# Patient Record
Sex: MALE | Race: WHITE | NOT HISPANIC OR LATINO | Employment: FULL TIME | URBAN - METROPOLITAN AREA
[De-identification: names, ages, dates, MRNs, and addresses within clinical notes are randomized per-mention and may not be internally consistent; named-entity substitution may affect disease eponyms.]

---

## 2018-11-01 ENCOUNTER — HOSPITAL ENCOUNTER (EMERGENCY)
Facility: CLINIC | Age: 23
Discharge: HOME | End: 2018-11-01
Attending: FAMILY MEDICINE | Admitting: FAMILY MEDICINE
Payer: OTHER MISCELLANEOUS

## 2018-11-01 VITALS
DIASTOLIC BLOOD PRESSURE: 93 MMHG | OXYGEN SATURATION: 99 % | TEMPERATURE: 97.3 F | HEART RATE: 80 BPM | SYSTOLIC BLOOD PRESSURE: 132 MMHG | RESPIRATION RATE: 20 BRPM

## 2018-11-01 DIAGNOSIS — S01.01XA LACERATION OF SCALP, INITIAL ENCOUNTER: ICD-10-CM

## 2018-11-01 PROCEDURE — 99283 EMERGENCY DEPT VISIT LOW MDM: CPT | Performed by: FAMILY MEDICINE

## 2018-11-01 PROCEDURE — 12001 RPR S/N/AX/GEN/TRNK 2.5CM/<: CPT | Mod: Z6 | Performed by: FAMILY MEDICINE

## 2018-11-01 PROCEDURE — 99283 EMERGENCY DEPT VISIT LOW MDM: CPT | Mod: 25 | Performed by: FAMILY MEDICINE

## 2018-11-01 PROCEDURE — 99282 EMERGENCY DEPT VISIT SF MDM: CPT | Performed by: FAMILY MEDICINE

## 2018-11-01 PROCEDURE — 12001 RPR S/N/AX/GEN/TRNK 2.5CM/<: CPT | Performed by: FAMILY MEDICINE

## 2018-11-01 NOTE — ED AVS SNAPSHOT
[facility] Emergency Department    [address] [place] [address]    Phone:  [phone]    Fax:  [phone]                                       Anjum Silva   MRN: [id number]    Department:  [facility] Emergency Department   Date of Visit:  11/1/2018           After Visit Summary Signature Page     I have received my discharge instructions, and my questions have been answered. I have discussed any challenges I see with this plan with the nurse or doctor.    ..........................................................................................................................................  Patient/Patient Representative Signature      ..........................................................................................................................................  Patient Representative Print Name and Relationship to Patient    ..................................................               ................................................  Date                                   Time    ..........................................................................................................................................  Reviewed by Signature/Title    ...................................................              ..............................................  Date                                               Time          22EPIC Rev 08/18

## 2018-11-01 NOTE — ED TRIAGE NOTES
Pt had a small, less than 20 lb magnet like piece of equipment fall on the top of his head. Has a small Lac on the R side toward the front of his head. No loc and No neck pain. Work related. Tetanus up to date..

## 2018-11-01 NOTE — DISCHARGE INSTRUCTIONS
We used hair sutures to close the laceration.  The glue will wear off on its own.  You can shower and gently wash your hair but do not scrub around that wound.  Watch for signs of infection.  Recheck in clinic if persistent problems or return to the ED if worse/concerns.  Try to keep the wound clean.  It was nice visiting with both of you this morning.  I hope this heals up quickly for you.    Thank you for choosing [facility]. We appreciate the opportunity to meet your urgent medical needs. Please let us know if we could have done anything to make your stay more satisfying.    After discharge, please closely monitor for any new or worsening symptoms. Return to the Emergency Department if you develop any acute worsening signs or symptoms.    If you had lab work, cultures or imaging studies done during your stay, the final results may still be pending. We will call you if your plan of care needs to change. However, if you are not improving as expected, please follow up with your primary care provider or clinic.     Start any prescription medications that were prescribed to you and take them as directed.     Please see additional handouts that may be pertinent to your condition.

## 2018-11-01 NOTE — ED PROVIDER NOTES
History     Chief Complaint   Patient presents with     Head Injury     HPI  Anjum Silva is a 23 year old male who since to the ED with a scalp laceration that occurred at about 3:00 this morning while at work.  He works for [employer] and is accompanied by his boss.  He placed a metal object that weighed about 20 pounds up on a shelf and fell about 2 feet and landed on the top of his head.  He suffered no loss of consciousness.  No dizziness or lightheadedness.  No neck pain.  No other injuries.  He states he is up-to-date on his tetanus.  Bleeding was controlled with pressure.  He is otherwise in good health.    Problem List:    There are no active problems to display for this patient.       Past Medical History:    No past medical history on file.    Past Surgical History:    No past surgical history on file.    Family History:    No family history on file.    Social History:  Marital Status:   [2]  Social History   Substance Use Topics     Smoking status: Not on file     Smokeless tobacco: Not on file     Alcohol use Not on file        Medications:      No current outpatient prescriptions on file.      Review of Systems   All other systems reviewed and are negative.      Physical Exam   BP: (!) 132/93  Pulse: 80  Temp: 97.3  F (36.3  C)  Resp: 20  SpO2: 99 %      Physical Exam   Constitutional: He is oriented to person, place, and time. He appears well-developed and well-nourished. No distress.   HENT:   He has a linear well approximated laceration right in the part of his hair to the right of the midline in the anterior superior scalp.  It is more of a scratch on either and and there is about 6 mm that does extend through the dermis on the central part of the laceration.   Eyes: EOM are normal. Pupils are equal, round, and reactive to light.   Neck: Neck supple.   Nontender.   Pulmonary/Chest: Effort normal. No respiratory distress.   Neurological: He is alert and oriented to person, place, and  time. He has normal strength. No cranial nerve deficit or sensory deficit. GCS eye subscore is 4. GCS verbal subscore is 5. GCS motor subscore is 6.   Skin: Skin is warm and dry.   Psychiatric: He has a normal mood and affect.       ED Course  (with Medical Decision Making)    23-year-old gentleman with a scalp laceration.  No other concerning findings.  It is well approximated and clean.  We discussed stapling versus hair sutures and he opted for the latter.  It came together quite nicely.  The hair sutures were twisted and then secured with Dermabond.  He tolerated this well.  His boss states that with the new Work Comp laws, as long as he is not on any restrictions, I did not need to fill out a report of workability or determine MMI.         ED Course     Procedures               Critical Care time:  none               No results found for this or any previous visit (from the past 24 hour(s)).    Medications - No data to display    Assessments & Plan      I have reviewed the nursing notes.    I have reviewed the findings, diagnosis, plan and need for follow up with the patient.       New Prescriptions    No medications on file       Final diagnoses:   Laceration of scalp, initial encounter - 0.6 cm       11/1/2018   [facility] EMERGENCY DEPARTMENT     Constantino Vinson MD  11/01/18 0458

## 2019-11-12 ENCOUNTER — OFFICE VISIT (OUTPATIENT)
Dept: FAMILY MEDICINE | Facility: CLINIC | Age: 24
End: 2019-11-12
Payer: COMMERCIAL

## 2019-11-12 VITALS
DIASTOLIC BLOOD PRESSURE: 66 MMHG | HEART RATE: 97 BPM | SYSTOLIC BLOOD PRESSURE: 128 MMHG | WEIGHT: 228 LBS | BODY MASS INDEX: 29.26 KG/M2 | RESPIRATION RATE: 18 BRPM | TEMPERATURE: 98.6 F | OXYGEN SATURATION: 98 % | HEIGHT: 74 IN

## 2019-11-12 DIAGNOSIS — Z30.09 ENCOUNTER FOR VASECTOMY COUNSELING: Primary | ICD-10-CM

## 2019-11-12 PROCEDURE — 99202 OFFICE O/P NEW SF 15 MIN: CPT | Performed by: FAMILY MEDICINE

## 2019-11-12 RX ORDER — DIAZEPAM 5 MG
TABLET ORAL
Qty: 2 TABLET | Refills: 0 | Status: SHIPPED | OUTPATIENT
Start: 2019-11-12 | End: 2019-11-12

## 2019-11-12 RX ORDER — DIAZEPAM 5 MG
TABLET ORAL
Qty: 2 TABLET | Refills: 0 | Status: SHIPPED | OUTPATIENT
Start: 2019-11-12

## 2019-11-12 SDOH — HEALTH STABILITY: MENTAL HEALTH: HOW OFTEN DO YOU HAVE A DRINK CONTAINING ALCOHOL?: NEVER

## 2019-11-12 ASSESSMENT — PAIN SCALES - GENERAL: PAINLEVEL: NO PAIN (0)

## 2019-11-12 ASSESSMENT — MIFFLIN-ST. JEOR: SCORE: 2088.95

## 2019-11-12 NOTE — PROGRESS NOTES
"SUBJECTIVE:  This 24 year old male is in for a vasectomy consultation.  He and his partner have decided they don't want to have any more children. They have decided that he will have the sterilization procedure instead of her.  He understands that this is a permanent procedure.  Reversal could be an option, but has a less than 25% success rate.  Patient was given information to read prior to the consultation.      We talked about the risks and benefits of the vasectomy; risks being that of potential for bleeding, infection, postoperative discomfort immediately which can last for a number of weeks afterwards, also the development of spermatoceles or sperm granulomas, epididymal cysts and the possibility of failure of the procedure producing failed attempt at sterility.     Also mentioned to the patient that there is some literature out there that suggests men who have vasectomies are at increased risk for prostate cancer; however, this literature is inconclusive and controversial.  It is recommended that men who have vasectomies should discuss appropriate prostate cancer screening with their regular provider.     I went through the procedure with the patient, how it is done, a midline incision in the scrotum measuring approximately 1/2 inch in length.  The vas deferens is brought up through this incision, dissected free and cut in two.  Each open end is cauterized and then the proximal or distal end is buried away from the other.  Patient had no questions when it came to the procedure itself.  I did show him pictures and diagrams of the procedure.  I showed him where a potential spermatocele or sperm granuloma can occur.      Again, the patient had no further questions for me.    OBJECTIVE:  /66   Pulse 97   Temp 98.6  F (37  C) (Temporal)   Resp 18   Ht 1.88 m (6' 2\")   Wt 103.4 kg (228 lb)   SpO2 98%   BMI 29.27 kg/m    On exam, this is a well-developed, well-nourished white male.    Exam reveals a " normal circumcised penis, no lesions.  Testes are descended bilaterally.  Exam was limited to the genitalia.  Both vas deferens were easily identified.  No other abnormalities were noted.      ASSESSMENT:  Undesired fertility in an adult male, requesting vasectomy.    PLAN:  He will schedule a vasectomy at his convenience for either the last appointment of the morning or on a Thursday or Friday afternoon.  He is aware that he will need to take the entire weekend off and have essentially bedrest for two days with ice packs every two hours and ibuprofen for discomfort.  I offered him a prescription for ibuprofen 800 mg to take every eight hours with food after the procedure, but he declined the prescription as he prefers to use the OTC equivalent.  I gave him a prescription for Valium two 5 mg tablets.  He will take 1 tablet upon arrival to clinic and will have the second tablet available to take as needed.  He understands that he will need a  after the procedure due to taking this type of medication.    If he has any further questions, he will contact me prior to the procedure or ask me on the day of the procedure.  He also is aware that he will need to bring a specimen after 12 weeks to make sure that he has cleared the storage vesicles of any residual sperm and to make sure that he is sterile.  He understands that until this is done, he and his partner should continue their regular method of contraception.  I also recommended that he submit a repeat specimen 1 year after the procedure to document continued sterility.      I spent 20 minutes of face to face time with the patient, >50% of which was spent counseling the patient on his future vasectomy.     Javad Mijares MD

## 2020-03-11 ENCOUNTER — HEALTH MAINTENANCE LETTER (OUTPATIENT)
Age: 25
End: 2020-03-11

## 2021-01-03 ENCOUNTER — HEALTH MAINTENANCE LETTER (OUTPATIENT)
Age: 26
End: 2021-01-03

## 2021-04-25 ENCOUNTER — HEALTH MAINTENANCE LETTER (OUTPATIENT)
Age: 26
End: 2021-04-25

## 2021-10-10 ENCOUNTER — HEALTH MAINTENANCE LETTER (OUTPATIENT)
Age: 26
End: 2021-10-10

## 2022-05-21 ENCOUNTER — HEALTH MAINTENANCE LETTER (OUTPATIENT)
Age: 27
End: 2022-05-21

## 2022-09-18 ENCOUNTER — HEALTH MAINTENANCE LETTER (OUTPATIENT)
Age: 27
End: 2022-09-18

## 2022-12-06 ENCOUNTER — HOSPITAL ENCOUNTER (OUTPATIENT)
Dept: BEHAVIORAL HEALTH | Facility: CLINIC | Age: 27
Discharge: HOME | End: 2022-12-06
Attending: FAMILY MEDICINE | Admitting: FAMILY MEDICINE
Payer: COMMERCIAL

## 2022-12-06 PROCEDURE — 90791 PSYCH DIAGNOSTIC EVALUATION: CPT | Mod: GT,95 | Performed by: COUNSELOR

## 2022-12-06 ASSESSMENT — ANXIETY QUESTIONNAIRES
IF YOU CHECKED OFF ANY PROBLEMS ON THIS QUESTIONNAIRE, HOW DIFFICULT HAVE THESE PROBLEMS MADE IT FOR YOU TO DO YOUR WORK, TAKE CARE OF THINGS AT HOME, OR GET ALONG WITH OTHER PEOPLE: VERY DIFFICULT
5. BEING SO RESTLESS THAT IT IS HARD TO SIT STILL: NOT AT ALL
3. WORRYING TOO MUCH ABOUT DIFFERENT THINGS: SEVERAL DAYS
6. BECOMING EASILY ANNOYED OR IRRITABLE: SEVERAL DAYS
2. NOT BEING ABLE TO STOP OR CONTROL WORRYING: SEVERAL DAYS
7. FEELING AFRAID AS IF SOMETHING AWFUL MIGHT HAPPEN: SEVERAL DAYS
1. FEELING NERVOUS, ANXIOUS, OR ON EDGE: MORE THAN HALF THE DAYS
8. IF YOU CHECKED OFF ANY PROBLEMS, HOW DIFFICULT HAVE THESE MADE IT FOR YOU TO DO YOUR WORK, TAKE CARE OF THINGS AT HOME, OR GET ALONG WITH OTHER PEOPLE?: VERY DIFFICULT
7. FEELING AFRAID AS IF SOMETHING AWFUL MIGHT HAPPEN: SEVERAL DAYS
GAD7 TOTAL SCORE: 7
4. TROUBLE RELAXING: SEVERAL DAYS

## 2022-12-06 ASSESSMENT — COLUMBIA-SUICIDE SEVERITY RATING SCALE - C-SSRS
1. IN THE PAST MONTH, HAVE YOU WISHED YOU WERE DEAD OR WISHED YOU COULD GO TO SLEEP AND NOT WAKE UP?: NO
6. HAVE YOU EVER DONE ANYTHING, STARTED TO DO ANYTHING, OR PREPARED TO DO ANYTHING TO END YOUR LIFE?: NO
2. HAVE YOU ACTUALLY HAD ANY THOUGHTS OF KILLING YOURSELF LIFETIME?: NO
4. HAVE YOU HAD THESE THOUGHTS AND HAD SOME INTENTION OF ACTING ON THEM?: NO
2. HAVE YOU ACTUALLY HAD ANY THOUGHTS OF KILLING YOURSELF IN THE PAST MONTH?: NO
5. HAVE YOU STARTED TO WORK OUT OR WORKED OUT THE DETAILS OF HOW TO KILL YOURSELF? DO YOU INTEND TO CARRY OUT THIS PLAN?: NO
1. IN THE PAST MONTH, HAVE YOU WISHED YOU WERE DEAD OR WISHED YOU COULD GO TO SLEEP AND NOT WAKE UP?: NO
3. HAVE YOU BEEN THINKING ABOUT HOW YOU MIGHT KILL YOURSELF?: NO

## 2022-12-06 ASSESSMENT — PATIENT HEALTH QUESTIONNAIRE - PHQ9: SUM OF ALL RESPONSES TO PHQ QUESTIONS 1-9: 10

## 2022-12-06 ASSESSMENT — PAIN SCALES - GENERAL: PAINLEVEL: NO PAIN (0)

## 2022-12-06 NOTE — PROGRESS NOTES
"[facility]      PATIENT'S NAME: Anjum Silva  PREFERRED NAME: Westley  PRONOUNS:   he/him    MRN: [id number]  : 1995  ADDRESS: [address] [facility] [address] NUMBER:  [id number]  DATE OF SERVICE: 22  START TIME: 1:00 PM  END TIME: 2:14 PM  PREFERRED PHONE: [phone]  May we leave a program related message: Yes  SERVICE MODALITY:  Video Visit:      Provider verified identity through the following two step process.  Patient provided:  Patient  and Patient address    Telemedicine Visit: The patient's condition can be safely assessed and treated via synchronous audio and visual telemedicine encounter.      Reason for Telemedicine Visit: Patient has requested telehealth visit    Originating Site (Patient Location): Patient's home    Distant Site (Provider Location): Provider Remote Setting- Home Office    Consent:  The patient/guardian has verbally consented to: the potential risks and benefits of telemedicine (video visit) versus in person care; bill my insurance or make self-payment for services provided; and responsibility for payment of non-covered services.     Patient would like the video invitation sent by:  My Chart    Mode of Communication:  Video Conference via [employer]    Distant Location (Provider):  Off-site    As the provider I attest to compliance with applicable laws and regulations related to telemedicine.    UNIVERSAL ADULT Mental Health DIAGNOSTIC ASSESSMENT    Identifying Information:  Patient is a 27 year old,  individual.  Patient was referred for an assessment by self.  Patient attended the session alone.    Chief Complaint:   The reason for seeking services at this time is: \"depression\". Patient reports finding it hard to find motivation to anything in the house during his days off.  The problem(s) began 22.    Patient has not attempted to resolve these concerns in the past.    Social/Family " History:  Patient reported that he grew up in [place], [place]. He was raised by biological parents.  Parents were always together.  Patient reported that his childhood was good.  Patient described his current relationships with family of origin as pretty good, close growing up.     The patient describes his cultural background as .  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 home schooled until 9th grade. very involved in with Mu-ism..  Contextual influences on patient's health include: NA.    These factors will be addressed in the Preliminary Treatment plan. Patient identified his preferred language to be English. Patient reported that he does not need the assistance of an  or other support involved in therapy.     Patient reported had no significant delays in developmental tasks.   Patient's highest education level was some college.  Patient identified the following learning problems: none reported.  Modifications will be used to assist communication in therapy. Patient reports that he is  able to understand written materials.    Patient reported the following relationship history :NA.  Patient's current relationship status is  for 10 years next July.   Patient identified his sexual orientation as heterosexual.  Patient reported having 2 children. Patient identified parents; mother; siblings; spouse as part of their support system.  Patient identified the quality of these relationships as good.      Patient's current living/housing situation involves staying in own home/apartment.  The immediate members of family and household include Soraya iSlva, 29,wife and they report that housing is stable.    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  Patient reports that his finances are obtained through employment. Patient does identify finances as a  current stressor.      Patient reported that he has not been involved with the legal system. Patient does not report being under probation/ parole/ jurisdiction or being under any current court jurisdiction.     Patient's Strengths and Limitations:  Patient identified the following strengths or resources that will help them succeed in treatment: commitment to health and well being, exercise routine, friends / good social support, family support, insight and motivation. Things that may interfere with the patient's success in treatment include: none identified.     Assessments:  The following assessments were completed by patient for this visit:  PHQ9:   PHQ-9 SCORE 12/6/2022   PHQ-9 Total Score 10     GAD7:   CARA-7 SCORE 12/6/2022   Total Score 7 (mild anxiety)   Total Score 7     CAGE-AID:   CAGE-AID Total Score 12/6/2022   Total Score 0   Total Score MyChart 0 (A total score of 2 or greater is considered clinically significant)     PROMIS 10-Global Health (all questions and answers displayed):   PROMIS 10 12/6/2022   In general, would you say your health is: Good   In general, would you say your quality of life is: Good   In general, how would you rate your physical health? Good   In general, how would you rate your mental health, including your mood and your ability to think? Poor   In general, how would you rate your satisfaction with your social activities and relationships? Fair   In general, please rate how well you carry out your usual social activities and roles Fair   To what extent are you able to carry out your everyday physical activities such as walking, climbing stairs, carrying groceries, or moving a chair? Completely   How often have you been bothered by emotional problems such as feeling anxious, depressed or irritable? Often   How would you rate your fatigue on average? Moderate   How would you rate your pain on average?   0 = No Pain  to  10 = Worst Imaginable Pain 3   In general, would you say  your health is: 3   In general, would you say your quality of life is: 3   In general, how would you rate your physical health? 3   In general, how would you rate your mental health, including your mood and your ability to think? 1   In general, how would you rate your satisfaction with your social activities and relationships? 2   In general, please rate how well you carry out your usual social activities and roles. (This includes activities at home, at work and in your community, and responsibilities as a parent, child, spouse, employee, friend, etc.) 2   To what extent are you able to carry out your everyday physical activities such as walking, climbing stairs, carrying groceries, or moving a chair? 5   In the past 7 days, how often have you been bothered by emotional problems such as feeling anxious, depressed, or irritable? 4   In the past 7 days, how would you rate your fatigue on average? 3   In the past 7 days, how would you rate your pain on average, where 0 means no pain, and 10 means worst imaginable pain? 3   Global Mental Health Score 8   Global Physical Health Score 15   PROMIS TOTAL - SUBSCORES 23   Some recent data might be hidden     Imperial Suicide Severity Rating Scale (Lifetime/Recent)  Imperial Suicide Severity Rating (Lifetime/Recent) 12/6/2022   Wish to be Dead (Lifetime) No   Non-Specific Active Suicidal Thoughts (Lifetime) No   Q1 Wished to be Dead (Past Month) no   Q2 Suicidal Thoughts (Past Month) no   Q3 Suicidal Thought Method no   Q4 Suicidal Intent without Specific Plan no   Q5 Suicide Intent with Specific Plan no   Q6 Suicide Behavior (Lifetime) no   Level of Risk per Screen low risk     WHODAS 2.0 Total Score 12/6/2022   Total Score 19   Total Score MyChart 19       Personal and Family Medical History:  Patient does not report a family history of mental health concerns.  Patient reports family history is not on file..     Patient does report Mental Health Diagnosis and/or Treatment.  Patient has not received mental health services in the past.  Psychiatric Hospitalizations: none.  Patient denies a history of civil commitment.  Currently, patient is not receiving other mental health services.  These include none.         Patient has not had a physical exam to rule out medical causes for current symptoms.  Date of last physical exam was greater than a year ago and client was encouraged to schedule an exam with PCP. The patient does not have a Primary Care Provider and was encouraged to establish care with a PCP..  Patient reports no current medical and/or dental concerns.  Patient denies any issues with pain..   There are not significant appetite / nutritional concerns / weight changes.   Patient does not report a history of head injury / trauma / cognitive impairment.      Patient reports not taking any current medications    Medication Adherence:  Patient reports not taking any psychotropic medications at this time      Patient Allergies:  No Known Allergies    Medical History:  No past medical history on file.      Current Mental Status Exam:   Appearance:  Appropriate    Eye Contact:  Fair   Psychomotor:  Normal       Gait / station:  no problem  Attitude / Demeanor: Interested  Speech      Rate / Production: Normal/ Responsive      Volume:  Normal  volume      Language:  no problems  Mood:   Depressed   Affect:   Lethargic    Thought Content: Clear   Thought Process: Coherent       Associations: No loosening of associations  Insight:   Fair   Judgment:  Intact   Orientation:  Person Place Time Situation  Attention/concentration: Good      Substance Use:  Patient did not report a family history of substance use concerns; see medical history section for details.  Patient has not received chemical dependency treatment in the past.  Patient has never been to detox.      Patient is not currently receiving any chemical dependency treatment.           Substance History of use Age of first use Date  of last use     Pattern and duration of use (include amounts and frequency)   Alcohol currently use   17 12/05/22 An occasional drink here and there   Cannabis   never used     REPORTS SUBSTANCE USE: N/A     Amphetamines   never used     REPORTS SUBSTANCE USE: N/A   Cocaine/crack    never used       REPORTS SUBSTANCE USE: N/A   Hallucinogens never used         REPORTS SUBSTANCE USE: N/A   Inhalants never used         REPORTS SUBSTANCE USE: N/A   Heroin never used         REPORTS SUBSTANCE USE: N/A   Other Opiates never used     REPORTS SUBSTANCE USE: N/A   Benzodiazepine   never used     REPORTS SUBSTANCE USE: N/A   Barbiturates never used     REPORTS SUBSTANCE USE: N/A   Over the counter meds never used     REPORTS SUBSTANCE USE: N/A   Caffeine currently use 14   Drinks [employer] at work, usually 1-2 sodas or a cup of coffee daily.   Nicotine  never used     REPORTS SUBSTANCE USE: N/A   Other substances not listed above:  Identify:  never used     REPORTS SUBSTANCE USE: N/A     Patient reported the following problems as a result of his substance use: no problems, not applicable.    Substance Use: No symptoms    Based on the negative CAGE score and clinical interview there  are indications of drug or alcohol abuse. Recommendation for substance abuse disorder evaluation with a substance use professional was given. Therapist did not recommend client to reduce use or abstain from alcohol or substance use. Therapist did not recommend structured treatment and or community support (AA, 12 step group, etc.). NA.      Significant Losses / Trauma / Abuse / Neglect Issues:   Patient did serve in the .  There are indications or report of significant loss, trauma, abuse or neglect issues related to: are no indications and client denies any losses, trauma, abuse, or neglect concerns.  Concerns for possible neglect are not present.     Safety Assessment:   Patient denies current homicidal ideation and behaviors.  Patient  denies current self-injurious ideation and behaviors.    Patient denied risk behaviors associated with substance use.  Patient denies any high risk behaviors associated with mental health symptoms.  Patient reports the following current concerns for his personal safety: None.  Patient reports there are firearms in the house. yes, they are secured. The firearms are secured in a locked space.    History of Safety Concerns:  Patient denied a history of homicidal ideation.     Patient denied a history of personal safety concerns.    Patient denied a history of assaultive behaviors.    Patient denied a history of sexual assault behaviors.     Patient denied a history of risk behaviors associated with substance use.  Patient denies any history of high risk behaviors associated with mental health symptoms.  Patient reports the following protective factors: dedication to family or friends; effectively controls impulses    Risk Plan:  See Recommendations for Safety and Risk Management Plan    Review of Symptoms per patient report:   Depression: Change in sleep, Lack of interest, Excessive or inappropriate guilt, Change in energy level, Feelings of hopelessness, Feelings of helplessness, Low self-worth, Ruminations, Irritability, Feeling sad, down, or depressed and Poor hygeine  Cher:  No Symptoms  Psychosis: No Symptoms  Anxiety: Excessive worry, Nervousness and Irritability  Panic:  No symptoms  Post Traumatic Stress Disorder:  No Symptoms   Eating Disorder: No Symptoms  ADD / ADHD:  No symptoms  Conduct Disorder: No symptoms  Autism Spectrum Disorder: No symptoms  Obsessive Compulsive Disorder: No Symptoms    Patient reports the following compulsive behaviors and treatment history: none.      Diagnostic Criteria:     Major Depressive Disorder  A) Single episode - symptoms have been present during the same 2-week period and represent a change from previous functioning 5 or more symptoms (required for diagnosis)   -  Depressed mood. Note: In children and adolescents, can be irritable mood.     - Diminished interest or pleasure in all, or almost all, activities.    - Fatigue or loss of energy.    - Feelings of worthlessness or inappropriate guilt.    - Diminished ability to think or concentrate, or indecisiveness.   B) The symptoms cause clinically significant distress or impairment in social, occupational, or other important areas of functioning  C) The episode is not attributable to the physiological effects of a substance or to another medical condition  D) The occurence of major depressive episode is not better explained by other thought / psychotic disorders  E) There has never been a manic episode or hypomanic episode    Functional Status:  Patient reports the following functional impairments:  management of the household and or completion of tasks; money management; relationship(s).     Nonprogrammatic care:  Patient is requesting basic services to address current mental health concerns.    Clinical Summary:  1. Reason for assessment: Seeking the appropriate mental health services.  2. Psychosocial, Cultural and Contextual Factors: 26 y/o   male, employed.  3. Principal DSM5 Diagnoses  (Sustained by DSM5 Criteria Listed Above):   296.22 (F32.1)  Major Depressive Disorder, Single Episode, Moderate _ and With melancholic features.  4. Other Diagnoses that is relevant to services:   None.  5. Provisional Diagnosis: none.  6. Prognosis: Expect Improvement and Relieve Acute Symptoms.  7. Likely consequences of symptoms if not treated: Without engaging in mental health services, patient s ongoing symptoms are more than likely to get worse and also experience a decreased daily in functioning and may require a higher level of care.  8. Client strengths include:  educated, employed, intelligent, motivated, responsible parent and support of family, friends and providers .     Recommendations:     1. Plan for Safety  "and Risk Management:   Safety and Risk: Recommended that patient call 911 or go to the local ED should there be a change in any of these risk factors..          Report to child / adult protection services was NA.     2. Patient's identified no tracey / Orthodoxy / spiritual influences, ethnic or cultural issues relevant to referral services at this time.     3. Initial Treatment will focus on:    Depressed Mood -    Functional Impairment at: home   Mood Instability -    Risk Management / Safety Concerns related to: none.     4. Resources/Service Plan:    services are not indicated.   Modifications to assist communication are not indicated.   Additional disability accommodations are not indicated.      5. Collaboration:   Collaboration / coordination of treatment will be initiated with the following support professionals: Targeted Case Management (TCM) and  Soraya Silva (Spouse) [phone] (Mobile).     6.  Referrals:   The following referral(s) will be initiated: Psychiatry. Next Scheduled Appointment: Date: Friday, 12/9/2022  Time: 9:00 am - 10:00 am  Provider: Ana Messer MA, CNP,RN  Location: [facility], [address], [address], [place] [address]  Phone: [phone]  Type: Telepsychiatry.      A Release of Information has been obtained for the following: Targeted Case Management (TCM) and EC.     Emergency Contact MAGGY was obtained.      Clinical Substantiation/medical necessity for the above recommendations:    Patient is a 27-year-old heterosexual   male with two children who is services at this time for: \"depression\". Patient reports finding it hard to find motivation to anything in the house during his days off.  Patient has not attempted to resolve these concerns in the past. Patient denies history of hospitalization or SI/SA. Patient endorses with Change in sleep, Lack of interest, Excessive or inappropriate guilt, change in energy level, Feelings of " hopelessness, Feelings of helplessness, Low self-worth, Ruminations, Irritability, Feeling sad, down, or depressed and Poor hygiene. It is worth noted that patient reports significant functional impairments in the following: management of the household and or completion of tasks; money management; relationship(s). Patient has agreed with referral to telepsychiatry at [facility], [address] [place] [address] with Ana Messer MA, CNP, RN on 12/9/22 at 9:00 AM. Patient's acute suicide risk was determined to be low due to no history of suicide attempts or suicidal thoughts in the past month, therefore no safety plan was developed. Patient is not currently under the influence of alcohol or illicit substances, denies experiencing command hallucinations and all firearms are secured. Patient's acute risk could be higher if noncompliant with treatment plan or using illicit substances or alcohol. Patient was instructed to present to his nearest emergency room if mental health symptoms become worse or exacerbate. Protective factors include dedication to family or friends; effectively controls impulses.    7. HILARY:    HILARY:  Discussed the general effects of drugs and alcohol on health and well-being. Provider gave patient printed information about the  effects of chemical use on his health and well being. Recommendations:  none.     8. Records:   These were reviewed at time of assessment.   Information in this assessment was obtained from the medical record and provided by patient who is a fair historian. Patient will have open access to his mental health medical record.    9.   Interactive Complexity: No      Provider Name/ Credentials:  Shane Llanes, RAFAEL, ALKA  Dual   Phone: [phone]  Fax: [phone]    December 6, 2022

## 2023-06-04 ENCOUNTER — HEALTH MAINTENANCE LETTER (OUTPATIENT)
Age: 28
End: 2023-06-04

## 2024-05-23 ENCOUNTER — HOSPITAL ENCOUNTER (EMERGENCY)
Facility: CLINIC | Age: 29
Discharge: HOME | End: 2024-05-23
Attending: PHYSICIAN ASSISTANT | Admitting: PHYSICIAN ASSISTANT
Payer: COMMERCIAL

## 2024-05-23 VITALS
OXYGEN SATURATION: 95 % | RESPIRATION RATE: 20 BRPM | BODY MASS INDEX: 28.89 KG/M2 | WEIGHT: 225 LBS | TEMPERATURE: 99.1 F | DIASTOLIC BLOOD PRESSURE: 91 MMHG | HEART RATE: 83 BPM | SYSTOLIC BLOOD PRESSURE: 146 MMHG

## 2024-05-23 DIAGNOSIS — H72.91 ACUTE OTITIS MEDIA OF RIGHT EAR WITH PERFORATION: ICD-10-CM

## 2024-05-23 DIAGNOSIS — H66.91 ACUTE OTITIS MEDIA OF RIGHT EAR WITH PERFORATION: ICD-10-CM

## 2024-05-23 PROCEDURE — 99283 EMERGENCY DEPT VISIT LOW MDM: CPT | Performed by: PHYSICIAN ASSISTANT

## 2024-05-23 PROCEDURE — 99284 EMERGENCY DEPT VISIT MOD MDM: CPT | Performed by: PHYSICIAN ASSISTANT

## 2024-05-23 RX ORDER — SERTRALINE HYDROCHLORIDE 100 MG/1
1 TABLET, FILM COATED ORAL
COMMUNITY
Start: 2024-05-13

## 2024-05-23 ASSESSMENT — COLUMBIA-SUICIDE SEVERITY RATING SCALE - C-SSRS
1. IN THE PAST MONTH, HAVE YOU WISHED YOU WERE DEAD OR WISHED YOU COULD GO TO SLEEP AND NOT WAKE UP?: NO
6. HAVE YOU EVER DONE ANYTHING, STARTED TO DO ANYTHING, OR PREPARED TO DO ANYTHING TO END YOUR LIFE?: NO
2. HAVE YOU ACTUALLY HAD ANY THOUGHTS OF KILLING YOURSELF IN THE PAST MONTH?: NO

## 2024-05-24 NOTE — DISCHARGE INSTRUCTIONS
Please take the antibiotics as prescribed for treatment of this ear infection.  You can use ibuprofen or Tylenol for discomfort.    Until the hole in your ear is healed, please adhere to the following recommendations:  - No swimming or diving  - Avoid getting water in the affected ear when bathing or showering (ie, use a cotton ball coated with petroleum jelly in the ear to create a barrier)    You can follow-up with a provider in the clinic to have the ear we looked at in 2 weeks.  Call and schedule this at your convenience.  If you do have any worsening symptoms please return to the emergency department.    Thank you for choosing [facility]. It was a pleasure taking care of you today. If you have any questions, please call [phone].    Rhonda Washington PA-C

## 2024-05-24 NOTE — ED TRIAGE NOTES
Patient presents with R ear pain and drainage. Pain started this morning, this evening he noted bloody drainage. States the pain has actually started to improve now. Took ibuprofen around 7PM with relief.     Triage Assessment (Adult)       Row Name 05/23/24 [id number]          Triage Assessment    Airway WDL WDL        Respiratory WDL    Respiratory WDL WDL        Skin Circulation/Temperature WDL    Skin Circulation/Temperature WDL WDL        Cardiac WDL    Cardiac WDL WDL        Peripheral/Neurovascular WDL    Peripheral Neurovascular WDL WDL        Cognitive/Neuro/Behavioral WDL    Cognitive/Neuro/Behavioral WDL WDL

## 2024-05-24 NOTE — ED PROVIDER NOTES
History     Chief Complaint   Patient presents with    Ear Drainage       HPI  Anjum Silva is a 29 year old male who presents to the emergency department complaining of right ear pain and drainage.  The patient reports he started having pain in the right ear this morning.  He took some ibuprofen this evening for the pain but also noticed this evening that the ear was draining some blood.  After it started draining it seemed to feel better.  He had a fever of 101  F last night but no fevers today.  He has not had any congestion, cough, or sore throat.  Otherwise at baseline health.        Allergies:  No Known Allergies    Problem List:    There are no problems to display for this patient.       Past Medical History:    No past medical history on file.    Past Surgical History:    No past surgical history on file.    Family History:    Family History   Problem Relation Age of Onset    Depression Mother     Depression Maternal Grandmother        Social History:  Marital Status:   [2]  Social History     Tobacco Use    Smoking status: Never    Smokeless tobacco: Never   Substance Use Topics    Alcohol use: Never    Drug use: Never        Medications:    amoxicillin-clavulanate (AUGMENTIN) 875-125 MG tablet  sertraline (ZOLOFT) 100 MG tablet  diazepam (VALIUM) 5 MG tablet          Review of Systems   All other systems reviewed and are negative.          Physical Exam   BP: (!) 146/91  Pulse: 83  Temp: 99.1  F (37.3  C)  Resp: 20  Weight: 102.1 kg (225 lb)  SpO2: 95 %      Physical Exam  Vitals and nursing note reviewed.   Constitutional:       General: He is not in acute distress.     Appearance: Normal appearance. He is not ill-appearing, toxic-appearing or diaphoretic.   HENT:      Head: Normocephalic and atraumatic.      Left Ear: Tympanic membrane normal.      Ears:      Comments: Right TM appears to have perforation with erythema.  Purulent drainage noted throughout canal.     Nose: Nose normal.       Mouth/Throat:      Mouth: Mucous membranes are moist.      Pharynx: Oropharynx is clear.   Eyes:      Extraocular Movements: Extraocular movements intact.      Conjunctiva/sclera: Conjunctivae normal.   Pulmonary:      Effort: Pulmonary effort is normal. No respiratory distress.   Musculoskeletal:      Cervical back: Neck supple.   Skin:     General: Skin is warm and dry.   Neurological:      General: No focal deficit present.      Mental Status: He is alert. Mental status is at baseline.   Psychiatric:         Mood and Affect: Mood normal.             ED Course        Procedures        No results found for this or any previous visit (from the past 24 hour(s)).    Medications - No data to display      Assessments & Plan (with Medical Decision Making)  Anjum Silva is a 29 year old male who presents to the ED complaining of right ear pain with drainage that started today.  He had a fever yesterday but none today.  On arrival to the ED he was mildly hypertensive but afebrile with otherwise normal vital signs.  Exam today demonstrated erythema to the right TM with perforation noted and purulent drainage to the canal.  I discussed findings with patient who is agreeable to treating with a course of antibiotics.  Augmentin will be prescribed.  I advised that he have the area looked at in a couple weeks to ensure this perforation is healing on its own.  Advise no swimming or underwater activities until perforation has healed.  He can use ibuprofen or Tylenol for pain control.  Return precautions provided.  All questions answered and patient discharged home in stable condition.     I have reviewed the nursing notes.    I have reviewed the findings, diagnosis, plan and need for follow up with the patient.      Discharge Medication List as of 5/23/2024  9:44 PM        START taking these medications    Details   amoxicillin-clavulanate (AUGMENTIN) 875-125 MG tablet Take 1 tablet by mouth 2 times daily, Disp-20 tablet, R-0,  [facility]             Final diagnoses:   Acute otitis media of right ear with perforation     Note: Chart documentation done in part with Dragon Voice Recognition software. Although reviewed after completion, some word and grammatical errors may remain.     5/23/2024   [facility] EMERGENCY DEPT       Rhonda Washington PA-C  05/23/24 9417

## 2024-06-20 ENCOUNTER — OFFICE VISIT (OUTPATIENT)
Dept: FAMILY MEDICINE | Facility: CLINIC | Age: 29
End: 2024-06-20
Payer: COMMERCIAL

## 2024-06-20 VITALS
HEIGHT: 74 IN | TEMPERATURE: 97.4 F | RESPIRATION RATE: 18 BRPM | OXYGEN SATURATION: 97 % | HEART RATE: 74 BPM | DIASTOLIC BLOOD PRESSURE: 88 MMHG | SYSTOLIC BLOOD PRESSURE: 138 MMHG | WEIGHT: 236.5 LBS | BODY MASS INDEX: 30.35 KG/M2

## 2024-06-20 DIAGNOSIS — H72.91 TYMPANIC MEMBRANE RUPTURE, RIGHT: Primary | ICD-10-CM

## 2024-06-20 PROCEDURE — 99202 OFFICE O/P NEW SF 15 MIN: CPT

## 2024-06-20 ASSESSMENT — PAIN SCALES - GENERAL: PAINLEVEL: NO PAIN (0)

## 2024-06-20 NOTE — PATIENT INSTRUCTIONS
Ear checked today, there is a scab overlying where the perforation was.  Continue with over ear protection while at work.  Avoid getting water in the ears.  Follow-up in 1 to 2 weeks for ear check.  Follow-up sooner if you have any new or worsening symptoms.    Perforated Eardrum: Care Instructions  Overview     A tear or hole in the membrane of the middle ear is called a perforated or ruptured eardrum. This can happen if an infection builds up inside the ear or if the eardrum gets injured. You may find it hard to hear out of that ear or may hear a buzzing sound. You may have an earache or have fluids that drain from the ear.  Your eardrum should heal on its own in a few weeks, and you should hear normally then. If you have an infection, your doctor may prescribe antibiotics. Over-the-counter pain reliever may help your earache.  Your doctor will check to see if your eardrum has healed. If not, you may need surgery to repair the eardrum.  Follow-up care is a key part of your treatment and safety. Be sure to make and go to all appointments, and call your doctor if you are having problems. It's also a good idea to know your test results and keep a list of the medicines you take.  How can you care for yourself at home?  If your doctor prescribed antibiotics, take them as directed. Do not stop taking them just because you feel better. You need to take the full course of antibiotics.  Take an over-the-counter pain medicine, such as acetaminophen (Tylenol), ibuprofen (Advil, Motrin), or naproxen (Aleve), as needed. Read and follow all instructions on the label.  Do not take two or more pain medicines at the same time unless the doctor told you to. Many pain medicines have acetaminophen, which is Tylenol. Too much acetaminophen (Tylenol) can be harmful.  To ease pain, put a warm washcloth or a heating pad set on low on your ear. You may have some drainage from the ear.  Be careful when taking over-the-counter cold or flu  medicines and Tylenol at the same time. Many of these medicines have acetaminophen, which is Tylenol. Read the labels to make sure that you are not taking more than the recommended dose. Too much Tylenol can be harmful.  Keep your ears dry.  Take baths until your doctor says you can take showers again.  When you wash your hair, use cotton lightly coated with petroleum jelly as an earplug. Or ask your doctor about using earplugs.  Do not swim until your doctor says you can.  If you get water in your ears, turn your head to each side and pull the earlobe in different directions. This will help the water run out. If your ears are still wet, use a hair dryer set on the lowest heat. Hold the dryer several inches from your ear.  Do not put anything into your ear canal. For example, do not use a cotton swab to clean the inside of your ear. It can damage your ear. If you think you have something inside your ear, ask your doctor to check it.  When should you call for help?   Call your doctor now or seek immediate medical care if:    You have signs of infection, such as:  Increased pain, swelling, warmth, or redness.  Pus draining from the ear.  A fever.   Watch closely for changes in your health, and be sure to contact your doctor if:    You have changes in hearing.     You do not get better as expected.       [place]    Patient understood and verbally consented to the treatment plan. Discussed symptoms that would warrant an urgent or emergent visit. All of the patients' questions were answered. Patient was instructed to contact the clinic if questions or concerns arise. Recommend follow up appointments if symptoms worsen or fail to improve. Recommend follow up as needed. Recommend ER in the case of an emergency.    Melba Booker PA-C    Please note: Voice recognition software may have been used in preparing this note, unintended word substitutions may be present.

## 2024-06-20 NOTE — PROGRESS NOTES
"  Assessment & Plan     Tympanic membrane rupture, right  Ear checked today, there is a scab overlying where the perforation was.  Continue with over ear protection while at work.  Avoid getting water in the ears.  Follow-up in 1 to 2 weeks for ear check.  Follow-up sooner if you have any new or worsening symptoms.      I spent a total of 15 minutes on the day of the visit.   Time spent by me doing chart review, history and exam, documentation and further activities per the note    MED REC REQUIRED  Post Medication Reconciliation Status:   BMI  Estimated body mass index is 30.19 kg/m  as calculated from the following:    Height as of this encounter: 1.885 m (6' 2.21\").    Weight as of this encounter: 107.3 kg (236 lb 8 oz).         See Patient Instructions  Patient Instructions   Ear checked today, there is a scab overlying where the perforation was.  Continue with over ear protection while at work.  Avoid getting water in the ears.  Follow-up in 1 to 2 weeks for ear check.  Follow-up sooner if you have any new or worsening symptoms.    Perforated Eardrum: Care Instructions  Overview     A tear or hole in the membrane of the middle ear is called a perforated or ruptured eardrum. This can happen if an infection builds up inside the ear or if the eardrum gets injured. You may find it hard to hear out of that ear or may hear a buzzing sound. You may have an earache or have fluids that drain from the ear.  Your eardrum should heal on its own in a few weeks, and you should hear normally then. If you have an infection, your doctor may prescribe antibiotics. Over-the-counter pain reliever may help your earache.  Your doctor will check to see if your eardrum has healed. If not, you may need surgery to repair the eardrum.  Follow-up care is a key part of your treatment and safety. Be sure to make and go to all appointments, and call your doctor if you are having problems. It's also a good idea to know your test results and " keep a list of the medicines you take.  How can you care for yourself at home?  If your doctor prescribed antibiotics, take them as directed. Do not stop taking them just because you feel better. You need to take the full course of antibiotics.  Take an over-the-counter pain medicine, such as acetaminophen (Tylenol), ibuprofen (Advil, Motrin), or naproxen (Aleve), as needed. Read and follow all instructions on the label.  Do not take two or more pain medicines at the same time unless the doctor told you to. Many pain medicines have acetaminophen, which is Tylenol. Too much acetaminophen (Tylenol) can be harmful.  To ease pain, put a warm washcloth or a heating pad set on low on your ear. You may have some drainage from the ear.  Be careful when taking over-the-counter cold or flu medicines and Tylenol at the same time. Many of these medicines have acetaminophen, which is Tylenol. Read the labels to make sure that you are not taking more than the recommended dose. Too much Tylenol can be harmful.  Keep your ears dry.  Take baths until your doctor says you can take showers again.  When you wash your hair, use cotton lightly coated with petroleum jelly as an earplug. Or ask your doctor about using earplugs.  Do not swim until your doctor says you can.  If you get water in your ears, turn your head to each side and pull the earlobe in different directions. This will help the water run out. If your ears are still wet, use a hair dryer set on the lowest heat. Hold the dryer several inches from your ear.  Do not put anything into your ear canal. For example, do not use a cotton swab to clean the inside of your ear. It can damage your ear. If you think you have something inside your ear, ask your doctor to check it.  When should you call for help?   Call your doctor now or seek immediate medical care if:    You have signs of infection, such as:  Increased pain, swelling, warmth, or redness.  Pus draining from the ear.  A  fever.   Watch closely for changes in your health, and be sure to contact your doctor if:    You have changes in hearing.     You do not get better as expected.       [place]    Patient understood and verbally consented to the treatment plan. Discussed symptoms that would warrant an urgent or emergent visit. All of the patients' questions were answered. Patient was instructed to contact the clinic if questions or concerns arise. Recommend follow up appointments if symptoms worsen or fail to improve. Recommend follow up as needed. Recommend ER in the case of an emergency.    Melba Booker PA-C    Please note: Voice recognition software may have been used in preparing this note, unintended word substitutions may be present.      Nicolette Christy is a 29 year old, presenting for the following health issues:  Ear Problem (Follow up ear infection right ear)        6/20/2024     2:51 PM   Additional Questions   Roomed by Ruma     Ear Problem     ED  Discharged  5/23/2024 (25 minutes)  [facility] Emergency Dept     Rhonda Washington PA-C  Last attending  Treatment team Acute otitis media of right ear with perforation  Clinical impression Ear Drainage  Chief complaint     ED Provider Notes  Rhonda Washington PA-C (Physician Assistant)  Emergency Medicine  Expand All Collapse All     History          Chief Complaint   Patient presents with    Ear Drainage         HPI  Anjum Silva is a 29 year old male who presents to the emergency department complaining of right ear pain and drainage.  The patient reports he started having pain in the right ear this morning.  He took some ibuprofen this evening for the pain but also noticed this evening that the ear was draining some blood.  After it started draining it seemed to feel better.  He had a fever of 101  F last night but no fevers today.  He has not had any congestion, cough, or sore throat.  Otherwise at baseline health.            Allergies:  Allergies   No Known Allergies        Problem List:    There are no problems to display for this patient.        Past Medical History:    No past medical history on file.           Past Surgical History:    Past Surgical History   No past surgical history on file.        Family History:    Family History         Family History   Problem Relation Age of Onset    Depression Mother      Depression Maternal Grandmother              Social History:  Marital Status:   [2]  Social History              Tobacco Use    Smoking status: Never    Smokeless tobacco: Never   Substance Use Topics    Alcohol use: Never    Drug use: Never            Medications:      amoxicillin-clavulanate (AUGMENTIN) 875-125 MG tablet     sertraline (ZOLOFT) 100 MG tablet  diazepam (VALIUM) 5 MG tablet               Review of Systems   All other systems reviewed and are negative.              Physical Exam   BP: (!) 146/91  Pulse: 83  Temp: 99.1  F (37.3  C)  Resp: 20  Weight: 102.1 kg (225 lb)  SpO2: 95 %        Physical Exam  Vitals and nursing note reviewed.   Constitutional:       General: He is not in acute distress.     Appearance: Normal appearance. He is not ill-appearing, toxic-appearing or diaphoretic.   HENT:      Head: Normocephalic and atraumatic.      Left Ear: Tympanic membrane normal.      Ears:      Comments: Right TM appears to have perforation with erythema.  Purulent drainage noted throughout canal.     Nose: Nose normal.      Mouth/Throat:      Mouth: Mucous membranes are moist.      Pharynx: Oropharynx is clear.   Eyes:      Extraocular Movements: Extraocular movements intact.      Conjunctiva/sclera: Conjunctivae normal.   Pulmonary:      Effort: Pulmonary effort is normal. No respiratory distress.   Musculoskeletal:      Cervical back: Neck supple.   Skin:     General: Skin is warm and dry.   Neurological:      General: No focal deficit present.      Mental Status: He is alert. Mental status is at  baseline.   Psychiatric:         Mood and Affect: Mood normal.                  ED Course      Procedures           No results found for this or any previous visit (from the past 24 hour(s)).     Medications - No data to display        Assessments & Plan (with Medical Decision Making)  Anjum Silva is a 29 year old male who presents to the ED complaining of right ear pain with drainage that started today.  He had a fever yesterday but none today.  On arrival to the ED he was mildly hypertensive but afebrile with otherwise normal vital signs.  Exam today demonstrated erythema to the right TM with perforation noted and purulent drainage to the canal.  I discussed findings with patient who is agreeable to treating with a course of antibiotics.  Augmentin will be prescribed.  I advised that he have the area looked at in a couple weeks to ensure this perforation is healing on its own.  Advise no swimming or underwater activities until perforation has healed.  He can use ibuprofen or Tylenol for pain control.  Return precautions provided.  All questions answered and patient discharged home in stable condition.      I have reviewed the nursing notes.     I have reviewed the findings, diagnosis, plan and need for follow up with the patient.        Discharge Medication List as of 5/23/2024  9:44 PM              START taking these medications     Details   amoxicillin-clavulanate (AUGMENTIN) 875-125 MG tablet Take 1 tablet by mouth 2 times daily, Disp-20 tablet, R-0, [facility]                 Final diagnoses:   Acute otitis media of right ear with perforation      Note: Chart documentation done in part with Dragon Voice Recognition software. Although reviewed after completion, some word and grammatical errors may remain.      5/23/2024   [facility] EMERGENCY DEPT        Rhonda Washington PA-C  05/23/24 0233          Patient presents for ER follow-up from TM rupture of the right.  Reports that he had  "bloody discharge for few days after the rupture.  Treated with Augmentin, took the entire course of antibiotics.  Symptoms have improved, he still notes some pressure in the ears, however his hearing is returning on the right side.  He presents for an ear check.  Reports that he is wearing over ear protection at work.              Review of Systems  Constitutional, HEENT, cardiovascular, pulmonary, GI, , musculoskeletal, neuro, skin, endocrine and psych systems are negative, except as otherwise noted.      Objective    /88   Pulse 74   Temp 97.4  F (36.3  C) (Temporal)   Resp 18   Ht 1.885 m (6' 2.21\")   Wt 107.3 kg (236 lb 8 oz)   SpO2 97%   BMI 30.19 kg/m    Body mass index is 30.19 kg/m .  Physical Exam   GENERAL: alert and no distress  EYES: Eyes grossly normal to inspection, PERRL and conjunctivae and sclerae normal  HENT: ear canals normal.  Left TM, normal.  Right TM: At the 7 o'clock position there is a scab overlying where the perforation was, otherwise TM is intact with gray membrane and light reflex present., nose and mouth without ulcers or lesions  MS: no gross musculoskeletal defects noted, no edema  SKIN: no suspicious lesions or rashes  NEURO: Normal strength and tone, mentation intact and speech normal  PSYCH: mentation appears normal, affect normal/bright    No results found for any previous visit.     No results found for any visits on 06/20/24.  No results found.        Signed Electronically by: Melba Booker PA-C    "

## 2024-07-14 ENCOUNTER — HEALTH MAINTENANCE LETTER (OUTPATIENT)
Age: 29
End: 2024-07-14